# Patient Record
(demographics unavailable — no encounter records)

---

## 2024-11-20 NOTE — ASSESSMENT
[FreeTextEntry1] : Deborah is a well appearing 7 year old, with few gastroenterologic symptoms. The celiac antibodies that were found to be positive could be significant and should be re-evaluated. She should not remove gluten from her diet. I recommended to repeat her blood tests. Her abdomen is distended at times, and could be from inadequate bowel movements, so I recommended to start a trial of daily bowel movements. Discussed the benefit of consulting endocrinology regarding short stature.  In order to safely and effectively implement these recommendations, I asked for the patient to follow up with PA/NP in about 1-2 months, to optimize care as needed and re-evaluate status. Mother and father were reassured and satisfied with the plan.

## 2024-11-20 NOTE — CONSULT LETTER
[Dear  ___] : Dear  [unfilled], [Consult Letter:] : I had the pleasure of evaluating your patient, [unfilled]. [Please see my note below.] : Please see my note below. [Consult Closing:] : Thank you very much for allowing me to participate in the care of this patient.  If you have any questions, please do not hesitate to contact me. [Sincerely,] : Sincerely, [FreeTextEntry3] : Tyler Rodriguez MD MSc  Director, Pediatric Endoscopy Pediatric Gastroenterology and Nutrition [facility] [employer] [facility] [place]  Division of Pediatric Gastroenterology and Nutrition  [address], [address]  [place] [address]  [phone]

## 2024-11-20 NOTE — HISTORY OF PRESENT ILLNESS
[de-identified] :  referred by pediatrician for GI evaluation  due to concern for short stature, was found to have +antigliadin IgG no history of abdominal pain, nausea, vomiting, diarrhea, fevers, weight loss  has a distended abdomen at times no weight loss continues to consume a gluten containing diet BM almost daily, bristol 1-2, no blood no family history of celiac disease, IBD, Crohn disease, Ulcerative Colitis

## 2024-11-20 NOTE — PHYSICAL EXAM
[Well Developed] : well developed [NAD] : in no acute distress [PERRL] : pupils were equal, round, reactive to light  [Moist & Pink Mucous Membranes] : moist and pink mucous membranes [CTAB] : lungs clear to auscultation bilaterally [Regular Rate and Rhythm] : regular rate and rhythm [Normal S1, S2] : normal S1 and S2 [Soft] : soft  [Normal Bowel Sounds] : normal bowel sounds [No HSM] : no hepatosplenomegaly appreciated [Normal Tone] : normal tone [Well-Perfused] : well-perfused [Interactive] : interactive [icteric] : anicteric [Respiratory Distress] : no respiratory distress  [Distended] : distended [Tender] : non tender [Stool Palpable] : no stool palpable [Edema] : no edema [Cyanosis] : no cyanosis [Rash] : no rash [Jaundice] : no jaundice

## 2024-12-18 NOTE — ASSESSMENT
[Educated Patient & Family about Diagnosis] : educated the patient and family about the diagnosis [FreeTextEntry1] :  7 year old female with recent decrease in height velocity and positive AgA IgG with symptoms of mild intermittent abdominal distention. Repeat testing in November 2024 revealed normal screen- Normal total serum immunoglobulin A, AGA IgG 23, normal AGA IgA, Normal TTG IgG/IgA). CBC, CMP, ESR and thyroid screen unremarkable.  Discussed at length normal celiac screening and Celiac genetic testing c/w low risk. Mild abdominal distention most likely secondary to mild constipation and resolved on osmotic laxative. Discussed transitioning to fiber supplement.  PLAN: -Instructions given on how to transition to fiber supplement -Follow up PRN

## 2024-12-18 NOTE — CONSULT LETTER
[Dear  ___] : Dear  [unfilled], [Consult Letter:] : I had the pleasure of evaluating your patient, [unfilled]. [Please see my note below.] : Please see my note below. [Consult Closing:] : Thank you very much for allowing me to participate in the care of this patient.  If you have any questions, please do not hesitate to contact me. [Sincerely,] : Sincerely, [FreeTextEntry3] : [facility] Pediatric Gastroenterology, Liver Disease and Nutrition [facility] [phone]

## 2024-12-18 NOTE — HISTORY OF PRESENT ILLNESS
[de-identified] : 7 year old female with decrease in height velocity and found to have + celiac antibodies by PMD here for follow up visit.   Mary was initially seen by Dr. Rodriguez in November 2024. The celiac antibodies that were found to be positive could be significant and should be re-evaluated. She should not remove gluten from her diet. Recommended repeating blood tests. Her abdomen is distended at times, and could be from inadequate bowel movements, so I recommended to start a trial of daily bowel movements. Discussed the benefit of consulting endocrinology regarding short stature.   November 2024: Celiac panel unremarkable (Normal total serum immunoglobulin A, AGA IgG 23, normal AGA IgA, Normal TTG IgG/IgA). CBC, CMP, ESR and thyroid screen unremarkable.   Deborah comes in today for follow up visit. She is taking MiraLAX 1 cap daily. BM's are QD, # 4 on the [place] scale. No significant straining. Parents report that there may be less flatus since starting. There is no significant abdominal distention. No c/o abdominal pain. She is eating well, has a decent variety in her diet. She has gained weight since her last visit.   No recent illnesses.

## 2024-12-18 NOTE — PHYSICAL EXAM
[Well Developed] : well developed [NAD] : in no acute distress [PERRL] : pupils were equal, round, reactive to light  [Moist & Pink Mucous Membranes] : moist and pink mucous membranes [CTAB] : lungs clear to auscultation bilaterally [Regular Rate and Rhythm] : regular rate and rhythm [Normal S1, S2] : normal S1 and S2 [Soft] : soft  [Normal Bowel Sounds] : normal bowel sounds [No HSM] : no hepatosplenomegaly appreciated [Normal Tone] : normal tone [Well-Perfused] : well-perfused [Interactive] : interactive [icteric] : anicteric [Respiratory Distress] : no respiratory distress  [Distended] : non distended [Tender] : non tender [Stool Palpable] : no stool palpable [Rectal Exam Deferred] : rectal exam was deferred [Edema] : no edema [Cyanosis] : no cyanosis [Rash] : no rash [Jaundice] : no jaundice

## 2024-12-18 NOTE — REVIEW OF SYSTEMS
[Negative] : Skin [de-identified] :  -Decrease in height velocity-  Has appointment scheduled with Endocrinology in April 2025.  [Immunizations are up to date] : Immunizations are up to date

## 2024-12-18 NOTE — REASON FOR VISIT
[Mother] : mother [Father] : father [Medical Records] : medical records [Consultation] : a consultation visit [Consultation Follow Up] : a consultation follow up

## 2025-04-22 NOTE — PHYSICAL EXAM
[Healthy Appearing] : healthy appearing [Well Nourished] : well nourished [Interactive] : interactive [Well formed] : well formed [Normally Set] : normally set [Normal S1 and S2] : normal S1 and S2 [Clear to Ausculation Bilaterally] : clear to auscultation bilaterally [Abdomen Soft] : soft [Abdomen Tenderness] : non-tender [] : no hepatosplenomegaly [2] : was Thien stage 2 [Normal Appearance] : normal in appearance [Normal] : normal  [Murmur] : no murmurs

## 2025-04-22 NOTE — PAST MEDICAL HISTORY
[At Term] : at term [Normal Vaginal Route] : by normal vaginal route [Age Appropriate] : age appropriate developmental milestones met [] : There were no problems passing meconium within 24 - 48 hrs of life [de-identified] : vacuum assisted [FreeTextEntry1] : 6lbs 14oz [FreeTextEntry5] : reading therapy completed

## 2025-04-22 NOTE — HISTORY OF PRESENT ILLNESS
[Constipation] : constipation [Premenarchal] : premenarchal [Headaches] : no headaches [Visual Symptoms] : no ~T visual symptoms [Polyuria] : no polyuria [Polydipsia] : no polydipsia [Knee Pain] : no knee pain [Hip Pain] : no hip pain [Personality Changes] : ~T no personality changes [Palpitations] : no palpitations [Increased Appetite] : no increased appetite  [Change in School Performance] : no change in school performance [Fatigue] : no fatigue [Abdominal Pain] : no abdominal pain [Weight Loss] : no weight loss [Nausea] : no nausea [Vomiting] : no vomiting [FreeTextEntry2] : Deborah is a 7yr old female presenting for evaluation of growth referred by Peds Gastroenterology. She was initially seen by GI for concerns of abdominal distention and decrease in height growth velocity, however she had negative celiac work up and normal TFTs.  Per parents, her growth has been slow and her PMD was concerned about height velocity, however parents don't recall any decrease in percentiles specifically. She has a good appetite per parents, completes her meals, has no dietary restrictions. No weight loss noted. She does have constipation, has harder stools every 2-3 days, but parents state it improves when she is well hydrated.  Mom also noted some pubic hair a few months ago, however she has no body odor, no vaginal discharge, no breast buds.  GV annualized since Dec 2024 5.26 cm per year

## 2025-04-22 NOTE — REVIEW OF SYSTEMS
[Nl] : Neurological [Constipation] : constipation [Change in Appetite] : no change in appetite [Vomiting] : no vomiting [Diarrhea] : no diarrhea [Abdominal Pain] : no abdominal pain [Smokers in Home] : no one in home smokes

## 2025-04-22 NOTE — CONSULT LETTER
[Dear  ___] : Dear  [unfilled], [Consult Letter:] : I had the pleasure of evaluating your patient, [unfilled]. [Please see my note below.] : Please see my note below. [Sincerely,] : Sincerely, [FreeTextEntry3] : Josefa Aponte MD  [facility] Physician [facility] Division of Pediatric Endocrinology P: [phone] F: [phone]

## 2025-04-22 NOTE — ASSESSMENT
[FreeTextEntry1] : Deborah is a 7yr 9m F with no PMHX who presents for evaluation of growth. Parents report pediatrician was concerned about decreased growth velocity. She has been overall doing well with no reported decreased in height or weight percentiles, however we are unable to review growth charts from her PMD. Growth velocity is 5.26cm/yr, which is normal for her age, with height 18th percentile, weight 25th percentile, and BMI 40th percentile today. Advised parents to obtain growth charts from pediatrician to ensure there has not been a significant decrease in height or weight percentiles. There is pubic hair noted on exam which may reflect premature adrenarche, however patient is close to her 8th birthday and has no other signs of puberty reported or on exam. She has had normal TFTs and celiac labs, however will order growth factor studies and premature adrenarche profile, which parents may choose to complete after discussing with their pediatrician. Will schedule a follow up in 6-8 months to monitor growth, or if there are any new concerns.